# Patient Record
Sex: FEMALE | Race: OTHER | NOT HISPANIC OR LATINO
[De-identification: names, ages, dates, MRNs, and addresses within clinical notes are randomized per-mention and may not be internally consistent; named-entity substitution may affect disease eponyms.]

---

## 2020-04-29 ENCOUNTER — APPOINTMENT (OUTPATIENT)
Dept: DISASTER EMERGENCY | Facility: CLINIC | Age: 55
End: 2020-04-29
Payer: COMMERCIAL

## 2020-04-29 VITALS
DIASTOLIC BLOOD PRESSURE: 80 MMHG | HEART RATE: 75 BPM | OXYGEN SATURATION: 98 % | TEMPERATURE: 98.2 F | SYSTOLIC BLOOD PRESSURE: 140 MMHG

## 2020-04-29 DIAGNOSIS — R68.89 OTHER GENERAL SYMPTOMS AND SIGNS: ICD-10-CM

## 2020-04-29 LAB — SARS-COV-2 N GENE NPH QL NAA+PROBE: NOT DETECTED

## 2020-04-29 PROCEDURE — ZZZZZ: CPT

## 2020-09-23 ENCOUNTER — APPOINTMENT (OUTPATIENT)
Dept: SPEECH THERAPY | Facility: CLINIC | Age: 55
End: 2020-09-23

## 2020-09-29 ENCOUNTER — APPOINTMENT (OUTPATIENT)
Dept: OTOLARYNGOLOGY | Facility: CLINIC | Age: 55
End: 2020-09-29
Payer: COMMERCIAL

## 2020-09-29 VITALS
DIASTOLIC BLOOD PRESSURE: 82 MMHG | HEART RATE: 73 BPM | WEIGHT: 190 LBS | HEIGHT: 62 IN | BODY MASS INDEX: 34.96 KG/M2 | SYSTOLIC BLOOD PRESSURE: 129 MMHG

## 2020-09-29 DIAGNOSIS — E11.9 TYPE 2 DIABETES MELLITUS W/OUT COMPLICATIONS: ICD-10-CM

## 2020-09-29 DIAGNOSIS — Z78.9 OTHER SPECIFIED HEALTH STATUS: ICD-10-CM

## 2020-09-29 DIAGNOSIS — R09.81 NASAL CONGESTION: ICD-10-CM

## 2020-09-29 DIAGNOSIS — Z83.3 FAMILY HISTORY OF DIABETES MELLITUS: ICD-10-CM

## 2020-09-29 DIAGNOSIS — J30.0 VASOMOTOR RHINITIS: ICD-10-CM

## 2020-09-29 DIAGNOSIS — H93.11 TINNITUS, RIGHT EAR: ICD-10-CM

## 2020-09-29 PROCEDURE — 92567 TYMPANOMETRY: CPT

## 2020-09-29 PROCEDURE — 31231 NASAL ENDOSCOPY DX: CPT

## 2020-09-29 PROCEDURE — 92557 COMPREHENSIVE HEARING TEST: CPT

## 2020-09-29 PROCEDURE — 99204 OFFICE O/P NEW MOD 45 MIN: CPT | Mod: 25

## 2020-09-29 RX ORDER — LATANOPROST/PF 0.005 %
0.01 DROPS OPHTHALMIC (EYE)
Refills: 0 | Status: ACTIVE | COMMUNITY

## 2020-09-29 RX ORDER — FLUTICASONE PROPIONATE 50 UG/1
50 SPRAY, METERED NASAL DAILY
Qty: 1 | Refills: 5 | Status: ACTIVE | COMMUNITY
Start: 2020-09-29 | End: 1900-01-01

## 2020-09-29 RX ORDER — SAXAGLIPTIN AND METFORMIN HYDROCHLORIDE 2.5; 1 MG/1; MG/1
2.5-1 TABLET, FILM COATED, EXTENDED RELEASE ORAL
Refills: 0 | Status: ACTIVE | COMMUNITY

## 2020-09-29 RX ORDER — ATORVASTATIN CALCIUM 80 MG/1
TABLET, FILM COATED ORAL
Refills: 0 | Status: ACTIVE | COMMUNITY

## 2020-09-29 NOTE — PHYSICAL EXAM
[] : septum deviated to the left [de-identified] : Large turbinates that responded to vasoconstrictors [Normal] : orientation to person, place, and time: normal

## 2020-09-29 NOTE — PROCEDURE
[FreeTextEntry6] : Nasal Endoscopy (26455)\par \par After informed verbal consent, nasal endoscopy was performed due to anterior rhinoscopy insufficient to account for symptoms.  [ Flexible ][ Rigid ], scope # [] was used.  Topical vasoconstrictor and anesthetic was used.  The exam showed a deviated septum to [ right ][ left ][ midline ]. \par \par The nasal endoscope is passed via the right nasal cavity. The inferior, middle, and superior turbinates were large and responded to vasoconstrictors. The inferior, middle and superior meati were examined. The osteomeatal complex is clear with no polyposis or purulence. The sphenoethmoidal recess is clear with no polyposis or purulence. The choana is patent. \par \par The nasal endoscope is passed via the left nasal cavity. The inferior, middle, and superior turbinates were enlarged and responded to vasoconstrictors. The inferior, middle and superior meati were examined. The osteomeatal complex is clear with no polyposis or purulence. The sphenoethmoidal recess is clear with no polyposis or purulence. The choana is patent.  \par \par There is []% obstruction of the nasopharynx with adenoid tissue.\par \par

## 2020-09-29 NOTE — HISTORY OF PRESENT ILLNESS
[de-identified] : 54 year female here for evaluation for nasal congestion. States nasal congestion mostly at night for at least 10 years. Denies sinus pain/pressure, post nasal drip, nasal discharge. Denies sinus infection in the past 6 months.Use of nasal sprays in the past with temporary relief. Denies CT scan of sinuses. \par \par Reports intermittent tinnitus 3 times a month. Reports sounds occasionally becomes muffled.  Denies otalgia, otorrhea, ear infections in the last 6 months, hearing loss, dizziness, vertigo, headaches related to hearing.

## 2020-09-29 NOTE — DATA REVIEWED
[de-identified] : Audiogram showed hearing essentially normal there is a mild sensorineural hearing loss in left ear 2000 Hz

## 2022-05-10 ENCOUNTER — RX RENEWAL (OUTPATIENT)
Age: 57
End: 2022-05-10

## 2022-07-10 ENCOUNTER — RX RENEWAL (OUTPATIENT)
Age: 57
End: 2022-07-10